# Patient Record
Sex: FEMALE | Race: WHITE
[De-identification: names, ages, dates, MRNs, and addresses within clinical notes are randomized per-mention and may not be internally consistent; named-entity substitution may affect disease eponyms.]

---

## 2018-05-16 ENCOUNTER — HOSPITAL ENCOUNTER (EMERGENCY)
Dept: HOSPITAL 74 - FER | Age: 43
Discharge: HOME | End: 2018-05-16
Payer: COMMERCIAL

## 2018-05-16 VITALS — HEART RATE: 95 BPM | TEMPERATURE: 98.6 F | SYSTOLIC BLOOD PRESSURE: 127 MMHG | DIASTOLIC BLOOD PRESSURE: 82 MMHG

## 2018-05-16 VITALS — BODY MASS INDEX: 24.1 KG/M2

## 2018-05-16 DIAGNOSIS — S91.032A: Primary | ICD-10-CM
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
## 2018-05-16 DIAGNOSIS — S93.402A: ICD-10-CM
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
## 2018-05-16 PROCEDURE — 0HQNXZZ REPAIR LEFT FOOT SKIN, EXTERNAL APPROACH: ICD-10-PCS

## 2018-05-16 PROCEDURE — 3E0234Z INTRODUCTION OF SERUM, TOXOID AND VACCINE INTO MUSCLE, PERCUTANEOUS APPROACH: ICD-10-PCS

## 2018-05-16 NOTE — PDOC
*Physical Exam





- Vital Signs


 Last Vital Signs











Temp Pulse Resp BP Pulse Ox


 


 98.6 F   95 H  16   127/82   100 


 


 05/16/18 18:44  05/16/18 18:44  05/16/18 18:44  05/16/18 18:44  05/16/18 18:44














Progress Note





- Progress Note


Progress Note: 





  Care of this patient was transferred to me from Dr. Farooq at 7 PM





This is a 43-year-old female who injured her ankle. Patient has x-ray pending 

of the ankle.





There is a question as to whether or not this may be an open fracture given the 

fact that there appears to be a open wound over the fracture.





X-ray: No acute fracture dislocation however there is evidence of an old well-

healed fracture of the lateral malleolus.





Puncture wound cleaned and some Surgicel and a pressure dressing was applied 

over the wound.





Patient's tetanus status is unknown at this point so tetanus was updated.





Patient discharged home given a referral to an orthopedist. Patient given 

crutches and her wound was dressed and an Ace wrap was applied.





*DC/Admit/Observation/Transfer


Diagnosis at time of Disposition: 


Left ankle sprain


Qualifiers:


 Encounter type: initial encounter Involved ligament of ankle: unspecified 

ligament Qualified Code(s): S93.402A - Sprain of unspecified ligament of left 

ankle, initial encounter








- Discharge Dispostion


Disposition: HOME


Decision to Admit order: No





- Referrals





- Patient Instructions


Additional Instructions: 


For the pain Tylenol or Motrin as needed.





Wear your Ace wrap and use crutches as needed for ambulation until you're able 

to bear weight.





If you need an orthopedist to follow up with call Dr. Noel at [phone].





Return to the emergency department immediately with ANY new, persistent or 

worsening symptoms.





Continue any medications as previously prescribed by your physician.





You should follow up with your primary doctor as soon as possible regarding 

today's emergency department visit.


.


Please make sure your doctor reviews the results of your emergency evaluation.





Thank you for coming to the   Emergency Department today for your care. It was 

a pleasure to see you today. Please note that your evaluation is INCOMPLETE 

until you  follow-up with your doctor. 











- Post Discharge Activity

## 2018-05-16 NOTE — PDOC
History of Present Illness





- General


Chief Complaint: Injury


Stated Complaint: LEFT LEG INJURY


Time Seen by Provider: 05/16/18 18:51





- History of Present Illness


Initial Comments: 





05/16/18 18:52





Chief complaint: Left ankle injury





History of present illness: Patient tripped and fell while getting off the train

, injured her left ankle. Pain and swelling, bleeding medially, was unable to 

bear weight.





Review of systems: Denies pain or injury to the head neck chest abdomen spine 

pelvis or other extremities. Denies distal numbness tingling pain or limited 

motion in the foot. Denies chest pain, shortness of breath, abdominal pain, 

nausea, vomiting, diarrhea, visual or focal neurologic symptoms, unsteadiness 

of gait.





Past medical history: Healthy female except for occasional migraines. Took a 

triptan and Advil this morning for headache. Symptoms of headache have resolved.





Social/family history reviewed and noncontributory





Physical exam: Alert and oriented well-developed well-nourished no acute 

distress at rest.


Afebrile, vital signs normal


Left ankle: There is swelling, which is moderate, of the medial ankle, and also 

less so of the lateral ankle. There is a puncture wound with bleeding over the 

medial malleolus. There is no fifth metatarsal tenderness and no deformity or 

swelling of the foot. Pulses are full. No distal sensory or motor deficits.





Impression: Possible ankle fracture, possible puncture wound, possible open 

fracture.





Plan: X-ray and further orthopedic management depending on results.





Past History





- Past Medical History


Allergies/Adverse Reactions: 


 Allergies











Allergy/AdvReac Type Severity Reaction Status Date / Time


 


Penicillins Allergy Mild Rash Verified 05/16/18 18:44











Home Medications: 


Ambulatory Orders





NK [No Known Home Medication]  05/16/18 











Medical Decision Making





- Medical Decision Making





05/19/18 07:33


Patient is awaiting x-ray. Signed out to Dr. Steiner at 7 PM pending x-ray 

results and further orthopedic management.





*DC/Admit/Observation/Transfer


Diagnosis at time of Disposition: 


 Left ankle sprain








- Discharge Dispostion


Disposition: HOME


Condition at time of disposition: Stable





- Referrals





- Patient Instructions


Additional Instructions: 


For the pain Tylenol or Motrin as needed.





Wear your Ace wrap and use crutches as needed for ambulation until you're able 

to bear weight.





If you need an orthopedist to follow up with call Dr. Noel at [phone].





Return to the emergency department immediately with ANY new, persistent or 

worsening symptoms.





Continue any medications as previously prescribed by your physician.





You should follow up with your primary doctor as soon as possible regarding 

today's emergency department visit.


.


Please make sure your doctor reviews the results of your emergency evaluation.





Thank you for coming to the   Emergency Department today for your care. It was 

a pleasure to see you today. Please note that your evaluation is INCOMPLETE 

until you  follow-up with your doctor. 











- Post Discharge Activity

## 2018-05-23 ENCOUNTER — HOSPITAL ENCOUNTER (OUTPATIENT)
Dept: HOSPITAL 74 - JASU-SURG | Age: 43
Discharge: HOME | End: 2018-05-23
Attending: ORTHOPAEDIC SURGERY
Payer: COMMERCIAL

## 2018-05-23 VITALS — BODY MASS INDEX: 23.4 KG/M2

## 2018-05-23 VITALS — HEART RATE: 71 BPM | DIASTOLIC BLOOD PRESSURE: 59 MMHG | SYSTOLIC BLOOD PRESSURE: 111 MMHG | TEMPERATURE: 98.4 F

## 2018-05-23 DIAGNOSIS — S82.862A: Primary | ICD-10-CM
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
## 2018-05-23 PROCEDURE — 27826 TREAT LOWER LEG FRACTURE: CPT

## 2018-05-23 PROCEDURE — 0QSK04Z REPOSITION LEFT FIBULA WITH INTERNAL FIXATION DEVICE, OPEN APPROACH: ICD-10-PCS | Performed by: ORTHOPAEDIC SURGERY

## 2018-05-23 NOTE — OP
DATE OF OPERATION:  05/23/2018

 

PREOPERATIVE DIAGNOSIS:  Left Maisonneuve fracture.  

 

POSTOPERATIVE DIAGNOSIS:  Left Maisonneuve fracture.  

 

PROCEDURE:  Open reduction internal fixation left Maisonneuve fracture.  

 

SURGEON ATTENDING:  Javier Noel M.D. 

 

ANESTHESIA:  Regional and general.  

 

CLOSURE:  Two syndesmosis Tightropes and 3-0 nylon.  

 

COMPLICATIONS:  None.  

 

CONDITION:  To recovery room in stable condition.

 

DESCRIPTION OF PROCEDURE:  The patient was taken to the operating room on May 23,

2018.  General anesthesia and regional anesthesia was administered by the

anesthesiologist.  IV Kefzol was administered prophylactically prior to the case. 

The left lower extremity was prepped and draped in the usual sterile fashion.  The

ankle was held in a neutral and internal rotation position reducing the syndesmosis,

and this was confirmed by the fluoroscopy that the mortice was intact and the

syndesmosis was narrowed.  Two stab incisions made over the lateral aspect of the

fibula.  Guidewires were drilled in a divergent fashion.  Apical cortices were then

drilled, then the syndesmosis Tightrope was deployed to all 4 cortices.  This was

done again with the ankle in internal rotation and then neutral.  The sutures were

then cut snug.  The small stab incision was closed with 3-0 nylon.  X-rays to the AP,

lateral, and mortice views revealed excellent position syndesmosis.  Sterile pressure

dressing followed by U-splint was applied.  Patient awakened from anesthesia and

transferred to recovery room in stable condition.  No complications.  

 

 

STONEY MENDOZA/[id number]

DD: 05/23/2018 13:52

DT: 05/23/2018 23:08

Job #:  [id number]

## 2018-05-23 NOTE — HP
[facility]





- Chief Complaint


Chief Complaint: left ankle pain





- Past Medical History


Allergies/Adverse Reactions: 


 Allergies











Allergy/AdvReac Type Severity Reaction Status Date / Time


 


Penicillins Allergy Mild Rash Verified 05/22/18 18:30











...LMP: 05/09/18





- Current Medications


Current Medications: 


 Home Medications











 Medication  Instructions  Recorded


 


Ibuprofen [Motrin -] 400 mg PO PRN 05/23/18


 


Magnesium 200 mg PO DAILY 05/23/18


 


Oxycodone HCl/Acetaminophen 1 tab PO Q6H #30 tablet MDD 4 05/23/18





[Percocet 5-325 mg Tablet]  


 


Vitamin E 400 unit PO DAILY 05/23/18














Satellite Physical Exam





- Physical Examination


General Appearance: Well Nourished, Well Developed, Alert & Oriented x3


ENT: Clear


Lung: Normal air movement


Heart: Regular rate & rhythm


Extremities: Other (left ankle- + swelling, + ecchymosis, + ttp, decr rom, nvi 

xrays show widened mortise with proximal fibula fx)


Neurological: Intact, Alert, Oriented





Satellite Impression/Plan





- Impression/Plan


Impression: left ankle syndesmosis rupture, proximal fibula fx (Maisonneuve)


Operative Procedure: left ankle syndesmosis repair


Date to be Performed: 05/23/18

## 2018-05-23 NOTE — OP
Operative Note





- Note:


Operative Date: 05/23/18 ([facility])


Pre-Operative Diagnosis: left maisonneuve fracture


Operation: left ankle syndesmosis repair


Implants: 2 syndesmosis tight ropes (arthrex)


Post-Operative Diagnosis: Same as Pre-op


Surgeon: Javier Noel


Assistant: Bello Ireland


Anesthesiologist/CRNA: Keliy Shah


Anesthesia: General, Local


Estimated Blood Loss (mls): 5


Operative Report Dictated: Yes